# Patient Record
Sex: MALE | Race: WHITE | NOT HISPANIC OR LATINO | Employment: FULL TIME | URBAN - METROPOLITAN AREA
[De-identification: names, ages, dates, MRNs, and addresses within clinical notes are randomized per-mention and may not be internally consistent; named-entity substitution may affect disease eponyms.]

---

## 2023-08-16 LAB
CHOLESTEROL (MG/DL) IN SER/PLAS: 165 MG/DL (ref 0–199)
CHOLESTEROL IN HDL (MG/DL) IN SER/PLAS: 51 MG/DL
CHOLESTEROL/HDL RATIO: 3.2
ESTIMATED AVERAGE GLUCOSE FOR HBA1C: 85 MG/DL
HEMOGLOBIN A1C/HEMOGLOBIN TOTAL IN BLOOD: 4.6 %
LDL: 96 MG/DL (ref 0–99)
TRIGLYCERIDE (MG/DL) IN SER/PLAS: 92 MG/DL (ref 0–149)
VLDL: 18 MG/DL (ref 0–40)

## 2023-10-04 PROBLEM — F90.0 ADHD (ATTENTION DEFICIT HYPERACTIVITY DISORDER), INATTENTIVE TYPE: Status: ACTIVE | Noted: 2023-10-04

## 2023-10-04 PROBLEM — F32.A DEPRESSION: Status: ACTIVE | Noted: 2023-10-04

## 2023-10-04 PROBLEM — F98.8 ADD (ATTENTION DEFICIT DISORDER): Status: ACTIVE | Noted: 2021-12-20

## 2023-10-04 PROBLEM — L72.3 SEBACEOUS CYST: Status: ACTIVE | Noted: 2023-10-04

## 2023-10-04 PROBLEM — G43.909 MIGRAINE: Status: ACTIVE | Noted: 2021-12-20

## 2023-10-05 ENCOUNTER — TELEMEDICINE (OUTPATIENT)
Dept: BEHAVIORAL HEALTH | Facility: CLINIC | Age: 43
End: 2023-10-05
Payer: COMMERCIAL

## 2023-10-05 DIAGNOSIS — F32.89 OTHER DEPRESSION: ICD-10-CM

## 2023-10-05 PROCEDURE — 90834 PSYTX W PT 45 MINUTES: CPT | Performed by: PSYCHOLOGIST

## 2023-10-05 NOTE — PROGRESS NOTES
3 PM [id number] Indiv Psych for Depression, hx of ADHD (45 min)  Sixth session. Virtual. Doing OK, energy better. Able to get work done on manuscripts, one is accepted for publication. Trying to balance this with study time for boards. Has taken longer to get through material than he thought but making progress. Hopes to be done with material and on to practice tests at end of week. Moved back testing date to Oct 30. Plans on spending two weeks just taking tests. Confident in abilities. Not been exercising even though he knows he'd feel better. Discussed some concerns with his MPH program over taking leave and finances. Will address after taking exam. Still talking to Vanesa routinely but not socializing outside that. Feels a lot of pressure to prepare for exam and to work on manuscripts. Next: 1 month.

## 2023-11-11 RX ORDER — TADALAFIL 20 MG/1
TABLET ORAL
COMMUNITY
Start: 2023-06-28

## 2023-11-11 RX ORDER — BUPROPION HYDROCHLORIDE 150 MG/1
150 TABLET ORAL DAILY
COMMUNITY
Start: 2023-09-29 | End: 2023-11-22 | Stop reason: SDUPTHER

## 2023-11-16 ENCOUNTER — APPOINTMENT (OUTPATIENT)
Dept: BEHAVIORAL HEALTH | Facility: CLINIC | Age: 43
End: 2023-11-16
Payer: COMMERCIAL

## 2023-11-22 ENCOUNTER — TELEPHONE (OUTPATIENT)
Dept: BEHAVIORAL HEALTH | Facility: CLINIC | Age: 43
End: 2023-11-22
Payer: COMMERCIAL

## 2023-11-22 DIAGNOSIS — F32.4 MAJOR DEPRESSIVE DISORDER WITH SINGLE EPISODE, IN PARTIAL REMISSION (CMS-HCC): ICD-10-CM

## 2023-11-22 RX ORDER — BUPROPION HYDROCHLORIDE 150 MG/1
150 TABLET ORAL DAILY
Qty: 30 TABLET | Refills: 2 | Status: SHIPPED | OUTPATIENT
Start: 2023-11-22 | End: 2023-11-22 | Stop reason: SDUPTHER

## 2023-11-22 RX ORDER — BUPROPION HYDROCHLORIDE 150 MG/1
150 TABLET ORAL DAILY
Qty: 30 TABLET | Refills: 2 | Status: SHIPPED | OUTPATIENT
Start: 2023-11-22 | End: 2024-02-06 | Stop reason: SDUPTHER

## 2023-11-22 NOTE — TELEPHONE ENCOUNTER
"----- Message from Chandni Faustin RN sent at 11/22/2023  2:02 PM EST -----  Regarding: FW: Appointment/Refill  Contact: [phone]    ----- Message -----  From: Josafat Munoz \"Marty\"  Sent: 11/22/2023  11:13 AM EST  To: [id number] Clinical Support Staff  Subject: Appointment/Refill                               Dear Dr. Díaz    I received your voice text a few days ago. I will schedule with you after my exam which is in 7 days. I wanted to ask for a refill of Bupropion  mg before then because I only have 7 pills left. There have been no changes or side effects. Hope it’s possible before my test. Same pharmacy at [facility].    Happy thanksgiving     "

## 2024-01-18 ENCOUNTER — TELEMEDICINE (OUTPATIENT)
Dept: BEHAVIORAL HEALTH | Facility: CLINIC | Age: 44
End: 2024-01-18
Payer: COMMERCIAL

## 2024-01-18 DIAGNOSIS — F32.89 OTHER DEPRESSION: ICD-10-CM

## 2024-01-18 PROCEDURE — 90834 PSYTX W PT 45 MINUTES: CPT | Performed by: PSYCHOLOGIST

## 2024-01-18 NOTE — PROGRESS NOTES
3 PM 59588 Indiv Psych for Depression, hx of ADHD (45 min, [id number])  Seventh session, virtual. Supportive Therapy. Last seen in October. Passed boards at end of Oct, some relief. Ordered another study package. Finished priority manuscripts. 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 Waiting for license so can [facility] to [facility] for new visa. Is hoping it will come this week to allow them enough time to process. If approved could remain in country. If delayed, will have to go back to home country. Some anxiety regarding this. Has offer for full-time [employer] job but will have to be onboarded again. Still talking to Vanesa, hoping to have some serious talks about their potential future together. Believes they need to improve their communication skills as a couple. Not exercising but hopes to get back to. Discontinued antidepressant a month ago. Not taking stimulant for ADHD. Next: to call.

## 2024-02-06 ENCOUNTER — TELEPHONE (OUTPATIENT)
Dept: BEHAVIORAL HEALTH | Facility: CLINIC | Age: 44
End: 2024-02-06
Payer: COMMERCIAL

## 2024-02-06 DIAGNOSIS — F32.4 MAJOR DEPRESSIVE DISORDER WITH SINGLE EPISODE, IN PARTIAL REMISSION (CMS-HCC): ICD-10-CM

## 2024-02-06 DIAGNOSIS — F90.0 ADHD (ATTENTION DEFICIT HYPERACTIVITY DISORDER), INATTENTIVE TYPE: ICD-10-CM

## 2024-02-06 RX ORDER — DEXTROAMPHETAMINE SACCHARATE, AMPHETAMINE ASPARTATE MONOHYDRATE, DEXTROAMPHETAMINE SULFATE AND AMPHETAMINE SULFATE 5; 5; 5; 5 MG/1; MG/1; MG/1; MG/1
20 CAPSULE, EXTENDED RELEASE ORAL EVERY MORNING
Qty: 30 CAPSULE | Refills: 0 | Status: SHIPPED | OUTPATIENT
Start: 2024-02-06

## 2024-02-06 RX ORDER — BUPROPION HYDROCHLORIDE 150 MG/1
150 TABLET ORAL DAILY
Qty: 30 TABLET | Refills: 2 | Status: SHIPPED | OUTPATIENT
Start: 2024-02-06

## 2024-08-13 ENCOUNTER — PATIENT MESSAGE (OUTPATIENT)
Dept: BEHAVIORAL HEALTH | Facility: CLINIC | Age: 44
End: 2024-08-13
Payer: COMMERCIAL

## 2024-08-13 DIAGNOSIS — F32.4 MAJOR DEPRESSIVE DISORDER WITH SINGLE EPISODE, IN PARTIAL REMISSION (CMS-HCC): ICD-10-CM

## 2024-08-13 DIAGNOSIS — F90.0 ADHD (ATTENTION DEFICIT HYPERACTIVITY DISORDER), INATTENTIVE TYPE: ICD-10-CM

## 2024-08-15 ENCOUNTER — TELEPHONE (OUTPATIENT)
Dept: BEHAVIORAL HEALTH | Facility: CLINIC | Age: 44
End: 2024-08-15
Payer: COMMERCIAL

## 2024-08-15 RX ORDER — DEXTROAMPHETAMINE SACCHARATE, AMPHETAMINE ASPARTATE MONOHYDRATE, DEXTROAMPHETAMINE SULFATE AND AMPHETAMINE SULFATE 5; 5; 5; 5 MG/1; MG/1; MG/1; MG/1
20 CAPSULE, EXTENDED RELEASE ORAL DAILY
Qty: 30 CAPSULE | Refills: 0 | Status: SHIPPED | OUTPATIENT
Start: 2024-08-15

## 2024-08-15 RX ORDER — BUPROPION HYDROCHLORIDE 150 MG/1
150 TABLET ORAL DAILY
Qty: 30 TABLET | Refills: 2 | Status: SHIPPED | OUTPATIENT
Start: 2024-08-15

## 2024-09-11 ENCOUNTER — APPOINTMENT (OUTPATIENT)
Dept: BEHAVIORAL HEALTH | Facility: CLINIC | Age: 44
End: 2024-09-11
Payer: COMMERCIAL

## 2024-09-25 ENCOUNTER — APPOINTMENT (OUTPATIENT)
Dept: BEHAVIORAL HEALTH | Facility: CLINIC | Age: 44
End: 2024-09-25
Payer: COMMERCIAL

## 2024-09-25 DIAGNOSIS — F32.4 MAJOR DEPRESSIVE DISORDER WITH SINGLE EPISODE, IN PARTIAL REMISSION (CMS-HCC): ICD-10-CM

## 2024-09-25 DIAGNOSIS — F90.0 ADHD (ATTENTION DEFICIT HYPERACTIVITY DISORDER), INATTENTIVE TYPE: ICD-10-CM

## 2024-09-25 PROCEDURE — 99214 OFFICE O/P EST MOD 30 MIN: CPT | Performed by: PSYCHIATRY & NEUROLOGY

## 2024-09-25 RX ORDER — BUPROPION HYDROCHLORIDE 150 MG/1
150 TABLET ORAL DAILY
Qty: 30 TABLET | Refills: 2 | Status: SHIPPED | OUTPATIENT
Start: 2024-09-25

## 2024-09-25 RX ORDER — DEXTROAMPHETAMINE SACCHARATE, AMPHETAMINE ASPARTATE MONOHYDRATE, DEXTROAMPHETAMINE SULFATE AND AMPHETAMINE SULFATE 5; 5; 5; 5 MG/1; MG/1; MG/1; MG/1
20 CAPSULE, EXTENDED RELEASE ORAL EVERY MORNING
Qty: 30 CAPSULE | Refills: 0 | Status: SHIPPED | OUTPATIENT
Start: 2024-09-25

## 2024-09-25 RX ORDER — DEXTROAMPHETAMINE SACCHARATE, AMPHETAMINE ASPARTATE MONOHYDRATE, DEXTROAMPHETAMINE SULFATE AND AMPHETAMINE SULFATE 5; 5; 5; 5 MG/1; MG/1; MG/1; MG/1
20 CAPSULE, EXTENDED RELEASE ORAL EVERY MORNING
Qty: 30 CAPSULE | Refills: 0 | Status: SHIPPED | OUTPATIENT
Start: 2024-11-21

## 2024-09-25 RX ORDER — DEXTROAMPHETAMINE SACCHARATE, AMPHETAMINE ASPARTATE MONOHYDRATE, DEXTROAMPHETAMINE SULFATE AND AMPHETAMINE SULFATE 5; 5; 5; 5 MG/1; MG/1; MG/1; MG/1
20 CAPSULE, EXTENDED RELEASE ORAL DAILY
Qty: 30 CAPSULE | Refills: 0 | Status: SHIPPED | OUTPATIENT
Start: 2024-10-24

## 2024-09-25 NOTE — PROGRESS NOTES
"Outpatient Psychiatry - Follow-Up Visit with Established Patient       Subjective   Josafat Munoz \"Marty\", a 44 y.o. male, for follow up.  Patient was referred by No primary care provider on file..      Reason for Visit:  He has been experiencing ADHD, Depression.     HPI:   Pt reports he has been through a lot, had to return to [place], then had to get a new license.  He discusses having to leave the country for 3 months.  He felt the return actually helped a bit with his mood, his sense of connection to his family.  He would hve had to live [place] the states for 2-3 months without working.  He feels meds have been tolerable, and his mood has been better.  We discuss whether to go back on wellbutrin after a period of feeling good.  He has been using adderall for work days, feels that is very helpful in getting work done.  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  Procrastination has been less.  He has been working with Dr. Mcneil still, and feels it is going well.  They have moved to q2-3 weeks, because things have improved. He got an H1 visa. He has not had any crying spells. Sleep has been okay, staying up too late sometimes.. Appetite has been a little low, but no problems. He is having some enjoyment of things, soccer, exercising, watching soccer. He denies SI, no wishes to die, feels he would never do this.  He has met with his ex, and they have gone out a couple times. They have been talking daily. His sister and brother both had children this year, planning a visit in the next year. 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 His family is not aware of his depression treatment. He has wanted to get things in order first.  He will be getting a green card when possible.      Current Medications:    Current Outpatient Medications:     amphetamine-dextroamphetamine XR (Adderall XR) 20 mg 24 hr capsule, TAKE 1 CAPSULE BY MOUTH ONCE DAILY (DNF UNTIL 09/26/23), Disp: 30 capsule, " Rfl: 0    amphetamine-dextroamphetamine XR (Adderall XR) 20 mg 24 hr capsule, Take 1 capsule (20 mg) by mouth once daily in the morning., Disp: 30 capsule, Rfl: 0    amphetamine-dextroamphetamine XR (Adderall XR) 20 mg 24 hr capsule, TAKE 1 CAPSULE BY MOUTH ONCE DAILY, Disp: 30 capsule, Rfl: 0    buPROPion XL (Wellbutrin XL) 150 mg 24 hr tablet, Take 1 tablet (150 mg) by mouth once daily., Disp: 30 tablet, Rfl: 2    tadalafil 20 mg tablet, AS DIRECTED, Disp: , Rfl:   Medical History and Updates:  Past Medical History:   Diagnosis Date    Allergy status to unspecified drugs, medicaments and biological substances 06/20/2017    History of seasonal allergies    Vitamin D deficiency, unspecified     Vitamin D deficiency     Surgical History and Updates:  No past surgical history on file.  Family History and Updates:  Family History   Problem Relation Name Age of Onset    No Known Problems Mother      No Known Problems Father       Social History and Updates:  Social History     Socioeconomic History    Marital status: Single     Spouse name: Not on file    Number of children: Not on file    Years of education: Not on file    Highest education level: Not on file   Occupational History    Not on file   Tobacco Use    Smoking status: Not on file    Smokeless tobacco: Not on file   Substance and Sexual Activity    Alcohol use: Not on file    Drug use: Not on file    Sexual activity: Not on file   Other Topics Concern    Not on file   Social History Narrative    Not on file     Social Determinants of Health     Financial Resource Strain: Not on file   Food Insecurity: Not on file   Transportation Needs: Not on file   Physical Activity: Not on file   Stress: Not on file   Social Connections: Not on file   Intimate Partner Violence: Not on file   Housing Stability: Not on file       Additional historical information includes:   The patient's household members: Living alone, in [place] x 1 year, prior to that in [place].  "  Relationships: No longer seeing anyone.  Sister lives in [place], other living in [place] - researching sexual health and women's studies.   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 May find work here, must be underserved.   Legal history: none.    History: none.   Upbringing: Grew up in [place], attending [employer]. Happy childhood, \"cherished.\" Has family relationships of high quality.   Abuse/neglect history:   not physically abused   not sexually abused   no neglect/ abandonment   no domestic violence   not a witness to violence   not verbally or emotionally abused   Record Review: brief     Medical Review Of Systems:  Pertinent items are noted in HPI.    Psychiatric Review Of Systems:  Depressive Symptoms: Depressed/Irritable mood, Diminished interest, Poor concentration, Fatigue, and N/A  Manic Symptoms: negative  Anxiety Symptoms: Excessive worry, Difficulty controlling worry, and Difficulty concentration due to worry  Inattentive Symptoms: Often makes careless mistakes, Often has difficulty paying attention, Is often disorganized, Often losses things, Is often easily distracted, Is often forgetful, Often avoids/dislikes tasks with sustained mental effort, Often seems not to listen when spoken to directly, and Often fails to follow instructions or to finish schoolwork   Trauma Symptoms: None  Psychotic Symptoms: None     Objective   Mental Status Exam:  General Appearance: Well groomed, appropriate eye contact  Attitude/Behavior: Cooperative  Motor: No psychomotor agitation or retardation, no tremor or other abnormal movements  Speech: Normal rate, volume, prosody  Gait/Station: WFL - Within functional limits  Mood: Good  Affect: Euthymic, full-range  Thought Process: Linear, goal directed  Thought Associations: No loosening of associations  Thought Content: Normal  Perception: No perceptual abnormalities noted  Sensorium: Alert and oriented to person, place, time " and situation  Insight: Intact  Judgement: Intact  Cognition: Cognitively intact to conversational testing with respect to attention, orientation, fund of knowledge, recent and remote memory, and language    Other Objective Information including Laboratory and Imaging Results:  No visits with results within 30 Day(s) from this visit.   Latest known visit with results is:   Orders Only on 08/16/2023   Component Date Value Ref Range Status    Hemoglobin A1C 08/16/2023 4.6  % Final    Comment:      Diagnosis of Diabetes-Adults   Non-Diabetic: < or = 5.6%   Increased risk for developing diabetes: 5.7-6.4%   Diagnostic of diabetes: > or = 6.5%  .       Monitoring of Diabetes                Age (y)     Therapeutic Goal (%)   Adults:          >18           <7.0   Pediatrics:    13-18           <7.5                   7-12           <8.0                   0- 6            7.5-8.5   American Diabetes Association. Diabetes Care 33(S1), Jan 2010.      Estimated Average Glucose 08/16/2023 85  MG/DL Final    Cholesterol 08/16/2023 165  0 - 199 mg/dL Final    Comment: .      AGE      DESIRABLE   BORDERLINE HIGH   HIGH     0-19 Y     0 - 169       170 - 199     >/= 200    20-24 Y     0 - 189       190 - 224     >/= 225         >24 Y     0 - 199       200 - 239     >/= 240   **All ranges are based on fasting samples. Specific   therapeutic targets will vary based on patient-specific   cardiac risk.  .   Pediatric guidelines reference:Pediatrics 2011, 128(S5).   Adult guidelines reference: NCEP ATPIII Guidelines,     KIMBERLY 2001, 258:2486-97  .   Venipuncture immediately after or during the    administration of Metamizole may lead to falsely   low results. Testing should be performed immediately   prior to Metamizole dosing.      HDL 08/16/2023 51.0  mg/dL Final    Comment: .      AGE      VERY LOW   LOW     NORMAL    HIGH       0-19 Y       < 35   < 40     40-45     ----    20-24 Y       ----   < 40       >45     ----      >24 Y        ----   < 40     40-60      >60  .      Cholesterol/HDL Ratio 08/16/2023 3.2   Final    Comment: REF VALUES  DESIRABLE  < 3.4  HIGH RISK  > 5.0      LDL 08/16/2023 96  0 - 99 mg/dL Final    Comment: .                           NEAR      BORD      AGE      DESIRABLE  OPTIMAL    HIGH     HIGH     VERY HIGH     0-19 Y     0 - 109     ---    110-129   >/= 130     ----    20-24 Y     0 - 119     ---    120-159   >/= 160     ----      >24 Y     0 -  99   100-129  130-159   160-189     >/=190  .      VLDL 08/16/2023 18  0 - 40 mg/dL Final    Triglycerides 08/16/2023 92  0 - 149 mg/dL Final    Comment: .      AGE      DESIRABLE   BORDERLINE HIGH   HIGH     VERY HIGH   0 D-90 D    19 - 174         ----         ----        ----  91 D- 9 Y     0 -  74        75 -  99     >/= 100      ----    10-19 Y     0 -  89        90 - 129     >/= 130      ----    20-24 Y     0 - 114       115 - 149     >/= 150      ----         >24 Y     0 - 149       150 - 199    200- 499    >/= 500  .   Venipuncture immediately after or during the    administration of Metamizole may lead to falsely   low results. Testing should be performed immediately   prior to Metamizole dosing.         OARRS Reviewed: no concerns  08/15/2024 08/15/2024 1 Dextroamp-Amphet Er 20 Mg Cap 30.00 30 An [facility] [id number] [place] ([id number]) 0  [place]   02/07/2024 02/06/2024 2 Dextroamp-Amphet Er 20 Mg Cap 30.00 30 An [place] [id number] [place] ([id number]) 0  [place]   09/29/2023 08/29/2023 2 Dextroamp-Amphet Er 20 Mg Cap 30.00 30 An [place] [id number] [place] ([id number]) 0  [place]   08/15/2023 08/15/2023 2 Dextroamp-Amphet Er 20 Mg Cap 30.00 30 An [place] [id number] [place] ([id number]) 0  [place]   02/17/2023 01/18/2023 1 Dextroamp-Amphet Er 20 Mg Cap 30.00 30 An [place] [id number] [place] ([id number]) 0  [place]       Vitals:  There were no vitals filed for this visit.    Assessment/Plan   Diagnosis:   Patient Active Problem List   Diagnosis    ADD (attention deficit disorder)    ADHD (attention deficit hyperactivity disorder),  inattentive type    Migraine    Sebaceous cyst    Depression       Assessment:  35yo [place] m, hx of ADHD, Depression, presents for evaluation and treatment. He has had increasing feeling of falling behind, losing things, forgetting things, losing information, and being less organized. He is at acute and chronic low risk of suicide and homicide. He is likely to do well with previous regimen. Pt reporting worse mood, and open to trial of psychotherapy and medication.     Continue Adderall XR 20mg daily.  Continue Wellbutrin 150mg XL Daily.  I reviewed with the patient the possible side effects of medication, including all black box warnings.  Discussed options including SSRI  OARRS report reviewed no issues.    Treatment Goals:  Specify outcomes written in observable, behavioral terms:   Anxiety: reducing negative automatic thoughts and reducing physical symptoms of anxiety  Depression: increasing energy, increasing interest in usual activities, increasing motivation, reducing excessive guilt, reducing fatigue, and reducing negative automatic thoughts    Suicide Risk Assessment:  Low Risk -- Risk factors include: Depression Protective factors include:Denies current suicidal ideation, Denies history of suicide attempts , Future-oriented talk , and Willingness to seek help and support     Treatment Plan/Recommendations:   Follow-up plan was discussed with patient.      Referral to:  Outpatient individual therapy.    Review with patient: Treatment plan reviewed with the patient.  Medication risks/benefit reviewed with the patient    Time spent in therapy 20 min  Summary of Psychotherapy component:   Supportive therapy for depression sx  Total time spent 30 min    Mando Díaz MD

## 2024-12-18 ENCOUNTER — APPOINTMENT (OUTPATIENT)
Dept: BEHAVIORAL HEALTH | Facility: CLINIC | Age: 44
End: 2024-12-18
Payer: COMMERCIAL

## 2025-01-10 ENCOUNTER — APPOINTMENT (OUTPATIENT)
Dept: BEHAVIORAL HEALTH | Facility: CLINIC | Age: 45
End: 2025-01-10
Payer: COMMERCIAL

## 2025-01-10 DIAGNOSIS — F32.4 MAJOR DEPRESSIVE DISORDER WITH SINGLE EPISODE, IN PARTIAL REMISSION (CMS-HCC): ICD-10-CM

## 2025-01-10 DIAGNOSIS — F90.0 ADHD (ATTENTION DEFICIT HYPERACTIVITY DISORDER), INATTENTIVE TYPE: ICD-10-CM

## 2025-01-10 PROCEDURE — 99214 OFFICE O/P EST MOD 30 MIN: CPT | Performed by: PSYCHIATRY & NEUROLOGY

## 2025-01-10 RX ORDER — DEXTROAMPHETAMINE SACCHARATE, AMPHETAMINE ASPARTATE MONOHYDRATE, DEXTROAMPHETAMINE SULFATE AND AMPHETAMINE SULFATE 5; 5; 5; 5 MG/1; MG/1; MG/1; MG/1
20 CAPSULE, EXTENDED RELEASE ORAL EVERY MORNING
Qty: 30 CAPSULE | Refills: 0 | Status: SHIPPED | OUTPATIENT
Start: 2025-01-10

## 2025-01-10 RX ORDER — DEXTROAMPHETAMINE SACCHARATE, AMPHETAMINE ASPARTATE MONOHYDRATE, DEXTROAMPHETAMINE SULFATE AND AMPHETAMINE SULFATE 5; 5; 5; 5 MG/1; MG/1; MG/1; MG/1
20 CAPSULE, EXTENDED RELEASE ORAL EVERY MORNING
Qty: 30 CAPSULE | Refills: 0 | Status: SHIPPED | OUTPATIENT
Start: 2025-03-07

## 2025-01-10 RX ORDER — BUPROPION HYDROCHLORIDE 150 MG/1
150 TABLET ORAL DAILY
Qty: 30 TABLET | Refills: 2 | Status: SHIPPED | OUTPATIENT
Start: 2025-01-10

## 2025-01-10 RX ORDER — DEXTROAMPHETAMINE SACCHARATE, AMPHETAMINE ASPARTATE MONOHYDRATE, DEXTROAMPHETAMINE SULFATE AND AMPHETAMINE SULFATE 5; 5; 5; 5 MG/1; MG/1; MG/1; MG/1
20 CAPSULE, EXTENDED RELEASE ORAL DAILY
Qty: 30 CAPSULE | Refills: 0 | Status: SHIPPED | OUTPATIENT
Start: 2025-02-07

## 2025-01-10 NOTE — PROGRESS NOTES
"Outpatient Psychiatry - Follow-Up Visit with Established Patient       Baljit Munoz \"Marty\", a 44 y.o. male, for follow up.  Patient was referred by No primary care provider on file..      Reason for Visit:  He has been experiencing ADHD, Depression.     HPI:   Pt reports he is doing well.  He moved to a bigger apartment.  He has been working one week on one week off, and he feels the work is tolerable.  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  [facility] feels [facility].  He discusses feeling respected.  The hours are long, and taking a lot of responsibility for the full spectrum of the case is difficult.  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  He is staying active in research.  He discusses process of finding his next role.  He feels meds have been tolerable, and his mood has been better.  He has had a reasonable mood, and concentration has been good.  He has been using adderall for work days, feels that is very helpful in getting work done.  Procrastination has been less.  He has been working with Dr. Mcneil still, and feels it is going well.  They have moved to q2-3 weeks, because things have improved.  He got an H1 visa. He has not had any crying spells.  Sleep has been okay, staying up too late sometimes.  Appetite has been a little low, but no problems. He is having some enjoyment of things, soccer, exercising, watching soccer. He denies SI, no wishes to die, feels he would never do this.  He has met with his ex, and they have gone out a couple times. They have been talking daily. His sister and brother both had children this year, planning a visit in the next year. 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 He is spending more time with Vanesa, and thinking there may be a chance to get back together.  He has wanted to get things in order first.  He will be getting a green card when possible.    Current Medications:    Current " Outpatient Medications:     amphetamine-dextroamphetamine XR (Adderall XR) 20 mg 24 hr capsule, Take 1 capsule (20 mg) by mouth once daily in the morning., Disp: 30 capsule, Rfl: 0    amphetamine-dextroamphetamine XR (Adderall XR) 20 mg 24 hr capsule, Take 1 capsule (20 mg) by mouth once daily. Do not fill before October 24, 2024., Disp: 30 capsule, Rfl: 0    amphetamine-dextroamphetamine XR (Adderall XR) 20 mg 24 hr capsule, Take 1 capsule (20 mg) by mouth once daily in the morning. Do not fill before November 21, 2024., Disp: 30 capsule, Rfl: 0    buPROPion XL (Wellbutrin XL) 150 mg 24 hr tablet, Take 1 tablet (150 mg) by mouth once daily., Disp: 30 tablet, Rfl: 2    tadalafil 20 mg tablet, AS DIRECTED, Disp: , Rfl:   Medical History and Updates:  Past Medical History:   Diagnosis Date    Allergy status to unspecified drugs, medicaments and biological substances 06/20/2017    History of seasonal allergies    Vitamin D deficiency, unspecified     Vitamin D deficiency     Surgical History and Updates:  No past surgical history on file.  Family History and Updates:  Family History   Problem Relation Name Age of Onset    No Known Problems Mother      No Known Problems Father       Social History and Updates:  Social History     Socioeconomic History    Marital status: Single     Spouse name: Not on file    Number of children: Not on file    Years of education: Not on file    Highest education level: Not on file   Occupational History    Not on file   Tobacco Use    Smoking status: Not on file    Smokeless tobacco: Not on file   Substance and Sexual Activity    Alcohol use: Not on file    Drug use: Not on file    Sexual activity: Not on file   Other Topics Concern    Not on file   Social History Narrative    Not on file     Social Drivers of Health     Financial Resource Strain: Not on file   Food Insecurity: Not on file   Transportation Needs: Not on file   Physical Activity: Not on file   Stress: Not on file   Social  "Connections: Not on file   Intimate Partner Violence: Not on file   Housing Stability: Not on file       Additional historical information includes:   The patient's household members: Living alone, in [place] x 1 year, prior to that in [place].   Relationships: No longer seeing anyone.  Sister lives in [place], other living in [place] - researching sexual health and women's studies.   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 May find work here, must be underserved.   Legal history: none.    History: none.   Upbringing: Grew up in [place], attending [employer]. Happy childhood, \"cherished.\" Has family relationships of high quality.   Abuse/neglect history:   not physically abused   not sexually abused   no neglect/ abandonment   no domestic violence   not a witness to violence   not verbally or emotionally abused   Record Review: brief     Medical Review Of Systems:  Pertinent items are noted in HPI.    Psychiatric Review Of Systems:  Depressive Symptoms: Depressed/Irritable mood, Diminished interest, Poor concentration, Fatigue, and N/A  Manic Symptoms: negative  Anxiety Symptoms: Excessive worry, Difficulty controlling worry, and Difficulty concentration due to worry  Inattentive Symptoms: Often makes careless mistakes, Often has difficulty paying attention, Is often disorganized, Often losses things, Is often easily distracted, Is often forgetful, Often avoids/dislikes tasks with sustained mental effort, Often seems not to listen when spoken to directly, and Often fails to follow instructions or to finish schoolwork   Trauma Symptoms: None  Psychotic Symptoms: None     Objective   Mental Status Exam:  General Appearance: Well groomed, appropriate eye contact  Attitude/Behavior: Cooperative  Motor: No psychomotor agitation or retardation, no tremor or other abnormal movements  Speech: Normal rate, volume, prosody  Gait/Station: WFL - Within functional limits  Mood: " Good  Affect: Euthymic, full-range  Thought Process: Linear, goal directed  Thought Associations: No loosening of associations  Thought Content: Normal  Perception: No perceptual abnormalities noted  Sensorium: Alert and oriented to person, place, time and situation  Insight: Intact  Judgement: Intact  Cognition: Cognitively intact to conversational testing with respect to attention, orientation, fund of knowledge, recent and remote memory, and language    Other Objective Information including Laboratory and Imaging Results:  No visits with results within 30 Day(s) from this visit.   Latest known visit with results is:   Orders Only on 08/16/2023   Component Date Value Ref Range Status    Hemoglobin A1C 08/16/2023 4.6  % Final    Comment:      Diagnosis of Diabetes-Adults   Non-Diabetic: < or = 5.6%   Increased risk for developing diabetes: 5.7-6.4%   Diagnostic of diabetes: > or = 6.5%  .       Monitoring of Diabetes                Age (y)     Therapeutic Goal (%)   Adults:          >18           <7.0   Pediatrics:    13-18           <7.5                   7-12           <8.0                   0- 6            7.5-8.5   American Diabetes Association. Diabetes Care 33(S1), Jan 2010.      Estimated Average Glucose 08/16/2023 85  MG/DL Final    Cholesterol 08/16/2023 165  0 - 199 mg/dL Final    Comment: .      AGE      DESIRABLE   BORDERLINE HIGH   HIGH     0-19 Y     0 - 169       170 - 199     >/= 200    20-24 Y     0 - 189       190 - 224     >/= 225         >24 Y     0 - 199       200 - 239     >/= 240   **All ranges are based on fasting samples. Specific   therapeutic targets will vary based on patient-specific   cardiac risk.  .   Pediatric guidelines reference:Pediatrics 2011, 128(S5).   Adult guidelines reference: NCEP ATPIII Guidelines,     KIMBERLY 2001, 258:2486-97  .   Venipuncture immediately after or during the    administration of Metamizole may lead to falsely   low results. Testing should be performed  immediately   prior to Metamizole dosing.      HDL 08/16/2023 51.0  mg/dL Final    Comment: .      AGE      VERY LOW   LOW     NORMAL    HIGH       0-19 Y       < 35   < 40     40-45     ----    20-24 Y       ----   < 40       >45     ----      >24 Y       ----   < 40     40-60      >60  .      Cholesterol/HDL Ratio 08/16/2023 3.2   Final    Comment: REF VALUES  DESIRABLE  < 3.4  HIGH RISK  > 5.0      LDL 08/16/2023 96  0 - 99 mg/dL Final    Comment: .                           NEAR      BORD      AGE      DESIRABLE  OPTIMAL    HIGH     HIGH     VERY HIGH     0-19 Y     0 - 109     ---    110-129   >/= 130     ----    20-24 Y     0 - 119     ---    120-159   >/= 160     ----      >24 Y     0 -  99   100-129  130-159   160-189     >/=190  .      VLDL 08/16/2023 18  0 - 40 mg/dL Final    Triglycerides 08/16/2023 92  0 - 149 mg/dL Final    Comment: .      AGE      DESIRABLE   BORDERLINE HIGH   HIGH     VERY HIGH   0 D-90 D    19 - 174         ----         ----        ----  91 D- 9 Y     0 -  74        75 -  99     >/= 100      ----    10-19 Y     0 -  89        90 - 129     >/= 130      ----    20-24 Y     0 - 114       115 - 149     >/= 150      ----         >24 Y     0 - 149       150 - 199    200- 499    >/= 500  .   Venipuncture immediately after or during the    administration of Metamizole may lead to falsely   low results. Testing should be performed immediately   prior to Metamizole dosing.         OARRS Reviewed: no concerns  09/25/2024 09/25/2024 1 Dextroamp-Amphet Er 20 Mg Cap 30.00 30 An Hun [id number] [place] ([id number]) 0  [place]   08/15/2024 08/15/2024 1 Dextroamp-Amphet Er 20 Mg Cap 30.00 30 An Hun [id number] [place] ([id number]) 0  [place]   02/07/2024 02/06/2024 2 Dextroamp-Amphet Er 20 Mg Cap 30.00 30 An Hun [id number] [place] ([id number]) 0  [place]   09/29/2023 08/29/2023 2 Dextroamp-Amphet Er 20 Mg Cap 30.00 30 An Alex [id number] [place] ([id number]) 0  [place]   08/15/2023 08/15/2023 2 Dextroamp-Amphet Er 20 Mg Cap 30.00 30 Odalys Johnson  [id number] [place] ([id number]) 0  [place]   02/17/2023 01/18/2023 1 Dextroamp-Amphet Er 20 Mg Cap 30.00 30 An [facility] [phone] [facility] ([id number]) 0  [place]       Vitals:  There were no vitals filed for this visit.    Assessment/Plan   Diagnosis:   Patient Active Problem List   Diagnosis    ADD (attention deficit disorder)    ADHD (attention deficit hyperactivity disorder), inattentive type    Migraine    Sebaceous cyst    Depression       Assessment:  43yo [place] Syri[place] m, hx of ADHD, Depression, presents for evaluation and treatment. He has had increasing feeling of falling behind, losing things, forgetting things, losing information, and being less organized. He is at acute and chronic low risk of suicide and homicide. He is likely to do well with previous regimen. Pt reporting worse mood, and open to trial of psychotherapy and medication.     Continue Adderall XR 20mg daily.  Continue Wellbutrin 150mg XL Daily.  I reviewed with the patient the possible side effects of medication, including all black box warnings.  Discussed options including SSRI  OARRS report reviewed no issues.    Treatment Goals:  Specify outcomes written in observable, behavioral terms:   Anxiety: reducing negative automatic thoughts and reducing physical symptoms of anxiety  Depression: increasing energy, increasing interest in usual activities, increasing motivation, reducing excessive guilt, reducing fatigue, and reducing negative automatic thoughts    Suicide Risk Assessment:  Low Risk -- Risk factors include: Depression Protective factors include:Denies current suicidal ideation, Denies history of suicide attempts , Future-oriented talk , and Willingness to seek help and support     Treatment Plan/Recommendations:   Follow-up plan was discussed with patient.      Referral to:  Outpatient individual therapy.    Review with patient: Treatment plan reviewed with the patient.  Medication risks/benefit reviewed with the patient    Time spent in therapy 20  min  Summary of Psychotherapy component:   Supportive therapy for depression sx  Total time spent 30 min    Mando Díaz MD

## 2025-02-21 ENCOUNTER — APPOINTMENT (OUTPATIENT)
Dept: BEHAVIORAL HEALTH | Facility: CLINIC | Age: 45
End: 2025-02-21
Payer: COMMERCIAL

## 2025-02-21 DIAGNOSIS — F90.0 ADHD (ATTENTION DEFICIT HYPERACTIVITY DISORDER), INATTENTIVE TYPE: ICD-10-CM

## 2025-02-21 DIAGNOSIS — F32.4 MAJOR DEPRESSIVE DISORDER WITH SINGLE EPISODE, IN PARTIAL REMISSION (CMS-HCC): ICD-10-CM

## 2025-02-21 PROCEDURE — 99214 OFFICE O/P EST MOD 30 MIN: CPT | Performed by: PSYCHIATRY & NEUROLOGY

## 2025-02-21 RX ORDER — DEXTROAMPHETAMINE SACCHARATE, AMPHETAMINE ASPARTATE MONOHYDRATE, DEXTROAMPHETAMINE SULFATE AND AMPHETAMINE SULFATE 5; 5; 5; 5 MG/1; MG/1; MG/1; MG/1
20 CAPSULE, EXTENDED RELEASE ORAL DAILY
Qty: 30 CAPSULE | Refills: 0 | Status: SHIPPED | OUTPATIENT
Start: 2025-04-18

## 2025-02-21 RX ORDER — BUPROPION HYDROCHLORIDE 150 MG/1
150 TABLET ORAL DAILY
Qty: 30 TABLET | Refills: 2 | Status: SHIPPED | OUTPATIENT
Start: 2025-02-21

## 2025-02-21 RX ORDER — DEXTROAMPHETAMINE SACCHARATE, AMPHETAMINE ASPARTATE MONOHYDRATE, DEXTROAMPHETAMINE SULFATE AND AMPHETAMINE SULFATE 5; 5; 5; 5 MG/1; MG/1; MG/1; MG/1
20 CAPSULE, EXTENDED RELEASE ORAL EVERY MORNING
Qty: 30 CAPSULE | Refills: 0 | Status: SHIPPED | OUTPATIENT
Start: 2025-03-21

## 2025-02-21 RX ORDER — DEXTROAMPHETAMINE SACCHARATE, AMPHETAMINE ASPARTATE MONOHYDRATE, DEXTROAMPHETAMINE SULFATE AND AMPHETAMINE SULFATE 5; 5; 5; 5 MG/1; MG/1; MG/1; MG/1
20 CAPSULE, EXTENDED RELEASE ORAL EVERY MORNING
Qty: 30 CAPSULE | Refills: 0 | Status: SHIPPED | OUTPATIENT
Start: 2025-02-21

## 2025-02-21 NOTE — PROGRESS NOTES
"Outpatient Psychiatry - Follow-Up Visit with Established Patient       Subjective   Josafat Munoz \"Marty\", a 44 y.o. male, for follow up.  Patient was referred by No primary care provider on file..      Reason for Visit:  He has been experiencing ADHD, Depression.     HPI:   Pt reports he is doing well.  He feels he has increasing support.  He moved to a bigger apartment.  He has been working one week on one week off, and he feels the work is tolerable.  He has had 12 hour nights, and taking the adderall during his shifts.  He feels he has not made any errors and he has felt comfortable even with high intensity situations.  He has even enjoyed doing ED work.  He has felt on point with remembering where everyone is in.  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  He has attended better to sleep.  Showing up early for shifts.  [facility] feels [facility].  He discusses feeling respected.  The hours are long, and taking a lot of responsibility for the full spectrum of the case is difficult.  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  He is staying active in research.  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  His parents are coming to visit.  He discusses process of finding his next role.  He feels meds have been tolerable, and his mood has been better.  He has had a reasonable mood, and concentration has been good.  He has been using adderall for work days, feels that is very helpful in getting work done.  Procrastination has been less.  He has not seen Dr. Mcneil recently, but still wants to follow up.  He got an H1 visa. He has not had any crying spells.  Sleep has been okay, staying up too late sometimes.  Appetite has been a little low, but no problems. He is having some enjoyment of things, soccer, exercising, watching soccer. He denies SI, no wishes to die, feels he would never do this.  He has met with his ex, and they have gone out a " couple times. They have been talking daily. His sister and brother both had children this year, planning a visit in the next year. 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 He is spending more time with Vanesa, and thinking there may be a chance to get back together.  He has wanted to get things in order first.  He will be getting a green card when possible.    Current Medications:    Current Outpatient Medications:     amphetamine-dextroamphetamine XR (Adderall XR) 20 mg 24 hr capsule, Take 1 capsule (20 mg) by mouth once daily in the morning., Disp: 30 capsule, Rfl: 0    [START ON 3/7/2025] amphetamine-dextroamphetamine XR (Adderall XR) 20 mg 24 hr capsule, Take 1 capsule (20 mg) by mouth once daily in the morning. Do not fill before March 7, 2025., Disp: 30 capsule, Rfl: 0    amphetamine-dextroamphetamine XR (Adderall XR) 20 mg 24 hr capsule, Take 1 capsule (20 mg) by mouth once daily. Do not fill before February 7, 2025., Disp: 30 capsule, Rfl: 0    buPROPion XL (Wellbutrin XL) 150 mg 24 hr tablet, Take 1 tablet (150 mg) by mouth once daily., Disp: 30 tablet, Rfl: 2    tadalafil 20 mg tablet, AS DIRECTED, Disp: , Rfl:   Medical History and Updates:  Past Medical History:   Diagnosis Date    Allergy status to unspecified drugs, medicaments and biological substances 06/20/2017    History of seasonal allergies    Vitamin D deficiency, unspecified     Vitamin D deficiency     Surgical History and Updates:  No past surgical history on file.  Family History and Updates:  Family History   Problem Relation Name Age of Onset    No Known Problems Mother      No Known Problems Father       Social History and Updates:  Social History     Socioeconomic History    Marital status: Single     Spouse name: Not on file    Number of children: Not on file    Years of education: Not on file    Highest education level: Not on file   Occupational History    Not on file   Tobacco Use    Smoking status: Not on file     "Smokeless tobacco: Not on file   Substance and Sexual Activity    Alcohol use: Not on file    Drug use: Not on file    Sexual activity: Not on file   Other Topics Concern    Not on file   Social History Narrative    Not on file     Social Drivers of Health     Financial Resource Strain: Not on file   Food Insecurity: Not on file   Transportation Needs: Not on file   Physical Activity: Not on file   Stress: Not on file   Social Connections: Not on file   Intimate Partner Violence: Not on file   Housing Stability: Not on file       Additional historical information includes:   The patient's household members: Living alone, in [place] x 1 year, prior to that in [place].   Relationships: No longer seeing anyone.  Sister lives in [place], other living in [place] - researching sexual health and women's studies.   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 May find work here, must be underserved.   Legal history: none.    History: none.   Upbringing: Grew up in [place], attending [employer]. Happy childhood, \"cherished.\" Has family relationships of high quality.   Abuse/neglect history:   not physically abused   not sexually abused   no neglect/ abandonment   no domestic violence   not a witness to violence   not verbally or emotionally abused   Record Review: brief     Medical Review Of Systems:  Pertinent items are noted in HPI.    Psychiatric Review Of Systems:  Depressive Symptoms: Depressed/Irritable mood, Diminished interest, Poor concentration, Fatigue, and N/A  Manic Symptoms: negative  Anxiety Symptoms: Excessive worry, Difficulty controlling worry, and Difficulty concentration due to worry  Inattentive Symptoms: Often makes careless mistakes, Often has difficulty paying attention, Is often disorganized, Often losses things, Is often easily distracted, Is often forgetful, Often avoids/dislikes tasks with sustained mental effort, Often seems not to listen when spoken to " directly, and Often fails to follow instructions or to finish schoolwork   Trauma Symptoms: None  Psychotic Symptoms: None     Objective   Mental Status Exam:  General Appearance: Well groomed, appropriate eye contact  Attitude/Behavior: Cooperative  Motor: No psychomotor agitation or retardation, no tremor or other abnormal movements  Speech: Normal rate, volume, prosody  Gait/Station: WFL - Within functional limits  Mood: Good  Affect: Euthymic, full-range  Thought Process: Linear, goal directed  Thought Associations: No loosening of associations  Thought Content: Normal  Perception: No perceptual abnormalities noted  Sensorium: Alert and oriented to person, place, time and situation  Insight: Intact  Judgement: Intact  Cognition: Cognitively intact to conversational testing with respect to attention, orientation, fund of knowledge, recent and remote memory, and language    Other Objective Information including Laboratory and Imaging Results:  No visits with results within 30 Day(s) from this visit.   Latest known visit with results is:   Orders Only on 08/16/2023   Component Date Value Ref Range Status    Hemoglobin A1C 08/16/2023 4.6  % Final    Comment:      Diagnosis of Diabetes-Adults   Non-Diabetic: < or = 5.6%   Increased risk for developing diabetes: 5.7-6.4%   Diagnostic of diabetes: > or = 6.5%  .       Monitoring of Diabetes                Age (y)     Therapeutic Goal (%)   Adults:          >18           <7.0   Pediatrics:    13-18           <7.5                   7-12           <8.0                   0- 6            7.5-8.5   American Diabetes Association. Diabetes Care 33(S1), Jan 2010.      Estimated Average Glucose 08/16/2023 85  MG/DL Final    Cholesterol 08/16/2023 165  0 - 199 mg/dL Final    Comment: .      AGE      DESIRABLE   BORDERLINE HIGH   HIGH     0-19 Y     0 - 169       170 - 199     >/= 200    20-24 Y     0 - 189       190 - 224     >/= 225         >24 Y     0 - 199       200 - 239      >/= 240   **All ranges are based on fasting samples. Specific   therapeutic targets will vary based on patient-specific   cardiac risk.  .   Pediatric guidelines reference:Pediatrics 2011, 128(S5).   Adult guidelines reference: NCEP ATPIII Guidelines,     KIMBERLY 2001, 258:2486-97  .   Venipuncture immediately after or during the    administration of Metamizole may lead to falsely   low results. Testing should be performed immediately   prior to Metamizole dosing.      HDL 08/16/2023 51.0  mg/dL Final    Comment: .      AGE      VERY LOW   LOW     NORMAL    HIGH       0-19 Y       < 35   < 40     40-45     ----    20-24 Y       ----   < 40       >45     ----      >24 Y       ----   < 40     40-60      >60  .      Cholesterol/HDL Ratio 08/16/2023 3.2   Final    Comment: REF VALUES  DESIRABLE  < 3.4  HIGH RISK  > 5.0      LDL 08/16/2023 96  0 - 99 mg/dL Final    Comment: .                           NEAR      BORD      AGE      DESIRABLE  OPTIMAL    HIGH     HIGH     VERY HIGH     0-19 Y     0 - 109     ---    110-129   >/= 130     ----    20-24 Y     0 - 119     ---    120-159   >/= 160     ----      >24 Y     0 -  99   100-129  130-159   160-189     >/=190  .      VLDL 08/16/2023 18  0 - 40 mg/dL Final    Triglycerides 08/16/2023 92  0 - 149 mg/dL Final    Comment: .      AGE      DESIRABLE   BORDERLINE HIGH   HIGH     VERY HIGH   0 D-90 D    19 - 174         ----         ----        ----  91 D- 9 Y     0 -  74        75 -  99     >/= 100      ----    10-19 Y     0 -  89        90 - 129     >/= 130      ----    20-24 Y     0 - 114       115 - 149     >/= 150      ----         >24 Y     0 - 149       150 - 199    200- 499    >/= 500  .   Venipuncture immediately after or during the    administration of Metamizole may lead to falsely   low results. Testing should be performed immediately   prior to Metamizole dosing.         OARRS Reviewed: no concerns  01/10/2025 01/10/2025 2 Dextroamp-Amphet Er 20 Mg Cap 30.00 30 An Hun  [id number] [place] ([id number]) 0  [place]   09/25/2024 09/25/2024 2 Dextroamp-Amphet Er 20 Mg Cap 30.00 30 An Hun [id number] [place] ([id number]) 0  [place]   08/15/2024 08/15/2024 2 Dextroamp-Amphet Er 20 Mg Cap 30.00 30 An Hun [id number] [place] ([id number]) 0  [place]   02/07/2024 02/06/2024 1 Dextroamp-Amphet Er 20 Mg Cap 30.00 30 An Hun [id number] [place] ([id number]) 0  [place]   09/29/2023 08/29/2023 1 Dextroamp-Amphet Er 20 Mg Cap 30.00 30 An Hun [id number] [place] ([id number]) 0  [place]       Vitals:  There were no vitals filed for this visit.    Assessment/Plan   Diagnosis:   Patient Active Problem List   Diagnosis    ADD (attention deficit disorder)    ADHD (attention deficit hyperactivity disorder), inattentive type    Migraine    Sebaceous cyst    Depression       Assessment:  45yo [place] Welsh m, hx of ADHD, Depression, presents for evaluation and treatment. He has had increasing feeling of falling behind, losing things, forgetting things, losing information, and being less organized. He is at acute and chronic low risk of suicide and homicide. He is likely to do well with previous regimen. Pt reporting worse mood, and open to trial of psychotherapy and medication.     Continue Adderall XR 20mg daily.  Continue Wellbutrin 150mg XL Daily.  I reviewed with the patient the possible side effects of medication, including all black box warnings.  Discussed options including SSRI  OARRS report reviewed no issues.    Treatment Goals:  Specify outcomes written in observable, behavioral terms:   Anxiety: reducing negative automatic thoughts and reducing physical symptoms of anxiety  Depression: increasing energy, increasing interest in usual activities, increasing motivation, reducing excessive guilt, reducing fatigue, and reducing negative automatic thoughts    Suicide Risk Assessment:  Low Risk -- Risk factors include: Depression Protective factors include:Denies current suicidal ideation, Denies history of suicide attempts , Future-oriented talk ,  and Willingness to seek help and support     Treatment Plan/Recommendations:   Follow-up plan was discussed with patient.      Referral to:  Outpatient individual therapy.    Review with patient: Treatment plan reviewed with the patient.  Medication risks/benefit reviewed with the patient    Time spent in therapy 20 min  Summary of Psychotherapy component:   Supportive therapy for depression sx  Total time spent 30 min    Mando Díaz MD

## 2025-05-09 ENCOUNTER — APPOINTMENT (OUTPATIENT)
Dept: BEHAVIORAL HEALTH | Facility: CLINIC | Age: 45
End: 2025-05-09
Payer: COMMERCIAL

## 2025-05-09 DIAGNOSIS — F90.0 ADHD (ATTENTION DEFICIT HYPERACTIVITY DISORDER), INATTENTIVE TYPE: ICD-10-CM

## 2025-05-09 DIAGNOSIS — R53.82 CHRONIC FATIGUE: ICD-10-CM

## 2025-05-09 DIAGNOSIS — F32.4 MAJOR DEPRESSIVE DISORDER WITH SINGLE EPISODE, IN PARTIAL REMISSION: ICD-10-CM

## 2025-05-09 PROBLEM — R53.83 FATIGUE: Status: ACTIVE | Noted: 2025-05-09

## 2025-05-09 PROBLEM — E55.9 VITAMIN D DEFICIENCY: Status: ACTIVE | Noted: 2025-05-09

## 2025-05-09 PROBLEM — F32.A DEPRESSIVE DISORDER: Status: ACTIVE | Noted: 2025-05-09

## 2025-05-09 PROCEDURE — 99214 OFFICE O/P EST MOD 30 MIN: CPT | Performed by: PSYCHIATRY & NEUROLOGY

## 2025-05-09 RX ORDER — BUPROPION HYDROCHLORIDE 150 MG/1
150 TABLET ORAL DAILY
Qty: 30 TABLET | Refills: 2 | Status: SHIPPED | OUTPATIENT
Start: 2025-05-09

## 2025-05-09 RX ORDER — DEXTROAMPHETAMINE SACCHARATE, AMPHETAMINE ASPARTATE MONOHYDRATE, DEXTROAMPHETAMINE SULFATE AND AMPHETAMINE SULFATE 5; 5; 5; 5 MG/1; MG/1; MG/1; MG/1
20 CAPSULE, EXTENDED RELEASE ORAL EVERY MORNING
Qty: 30 CAPSULE | Refills: 0 | Status: SHIPPED | OUTPATIENT
Start: 2025-06-06

## 2025-05-09 RX ORDER — DEXTROAMPHETAMINE SACCHARATE, AMPHETAMINE ASPARTATE MONOHYDRATE, DEXTROAMPHETAMINE SULFATE AND AMPHETAMINE SULFATE 5; 5; 5; 5 MG/1; MG/1; MG/1; MG/1
20 CAPSULE, EXTENDED RELEASE ORAL DAILY
Qty: 30 CAPSULE | Refills: 0 | Status: SHIPPED | OUTPATIENT
Start: 2025-05-09

## 2025-05-09 RX ORDER — DEXTROAMPHETAMINE SACCHARATE, AMPHETAMINE ASPARTATE MONOHYDRATE, DEXTROAMPHETAMINE SULFATE AND AMPHETAMINE SULFATE 5; 5; 5; 5 MG/1; MG/1; MG/1; MG/1
20 CAPSULE, EXTENDED RELEASE ORAL EVERY MORNING
Qty: 30 CAPSULE | Refills: 0 | Status: SHIPPED | OUTPATIENT
Start: 2025-07-04

## 2025-05-09 NOTE — PROGRESS NOTES
"Outpatient Psychiatry - Follow-Up Visit with Established Patient       Subjective   Josafat Munoz \"Marty\", a 44 y.o. male, for follow up.  Patient was referred by No primary care provider on file..      Reason for Visit:  He has been experiencing ADHD, Depression.     HPI:   Pt reports he is doing well.  He finds working nights difficult.  He is otherwise very happy doing what he is doing.  He has been working one week on one week off, and he feels the work is tolerable.  He has 80 hrs, then 0 hrs alternating.  Taking the adderall during his shifts.  He feels he has not made any errors and he has felt comfortable even with high intensity situations.  He has even enjoyed doing ED work.  He has felt on point with remembering where everyone is.  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  He has attended better to sleep.  Showing up early for shifts.  He feels [facility].  He discusses feeling respected.  The hours are long, and taking a lot of responsibility for the full spectrum of the case is difficult.  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  He is staying active in research.  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  His parents are coming to visit.  He discusses process of finding his next role.  He feels meds have been tolerable, and his mood has been better.  He has had a reasonable mood, and concentration has been good.  He has been using adderall for work days, feels that is very helpful in getting work done.  Procrastination has been less.  He has not seen Dr. Mcneil recently, but still wants to follow up.  He got an H1 visa. He has not had any crying spells.  Sleep has been okay, staying up too late sometimes.  Appetite has been a little low, but no problems. He is having some enjoyment of things, soccer, exercising, watching soccer. He denies SI, no wishes to die, feels he would never do this.  He has met with his ex, and they have gone out a couple times. " They have been talking daily. His sister and brother both had children this year, planning a visit in the next year. 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 He is spending more time with Vanesa, and thinking there may be a chance to get back together.  He has wanted to get things in order first.  He will be getting a green card when possible.    Current Medications:    Current Outpatient Medications:     amphetamine-dextroamphetamine XR (Adderall XR) 20 mg 24 hr capsule, Take 1 capsule (20 mg) by mouth once daily in the morning. Do not fill before March 21, 2025., Disp: 30 capsule, Rfl: 0    amphetamine-dextroamphetamine XR (Adderall XR) 20 mg 24 hr capsule, Take 1 capsule (20 mg) by mouth once daily. Do not fill before April 18, 2025., Disp: 30 capsule, Rfl: 0    amphetamine-dextroamphetamine XR (Adderall XR) 20 mg 24 hr capsule, Take 1 capsule (20 mg) by mouth once daily in the morning., Disp: 30 capsule, Rfl: 0    buPROPion XL (Wellbutrin XL) 150 mg 24 hr tablet, Take 1 tablet (150 mg) by mouth once daily., Disp: 30 tablet, Rfl: 2    tadalafil 20 mg tablet, AS DIRECTED, Disp: , Rfl:   Medical History and Updates:  Past Medical History:   Diagnosis Date    Allergy status to unspecified drugs, medicaments and biological substances 06/20/2017    History of seasonal allergies    Vitamin D deficiency, unspecified     Vitamin D deficiency     Surgical History and Updates:  No past surgical history on file.  Family History and Updates:  Family History   Problem Relation Name Age of Onset    No Known Problems Mother      No Known Problems Father       Social History and Updates:  Social History     Socioeconomic History    Marital status: Single     Spouse name: Not on file    Number of children: Not on file    Years of education: Not on file    Highest education level: Not on file   Occupational History    Not on file   Tobacco Use    Smoking status: Not on file    Smokeless tobacco: Not on file  "  Substance and Sexual Activity    Alcohol use: Not on file    Drug use: Not on file    Sexual activity: Not on file   Other Topics Concern    Not on file   Social History Narrative    Not on file     Social Drivers of Health     Financial Resource Strain: Not on file   Food Insecurity: Not on file   Transportation Needs: Not on file   Physical Activity: Not on file   Stress: Not on file   Social Connections: Not on file   Intimate Partner Violence: Not on file   Housing Stability: Not on file       Additional historical information includes:   The patient's household members: Living alone, in [place] x 1 year, prior to that in [place].   Relationships: No longer seeing anyone.  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 May find work here, must be underserved.   Legal history: none.    History: none.   Upbringing: Grew up in [place], attending [employer]. Happy childhood, \"cherished.\" Has family relationships of high quality.   Abuse/neglect history:   not physically abused   not sexually abused   no neglect/ abandonment   no domestic violence   not a witness to violence   not verbally or emotionally abused   Record Review: brief     Medical Review Of Systems:  Pertinent items are noted in HPI.    Psychiatric Review Of Systems:  Depressive Symptoms: Depressed/Irritable mood, Diminished interest, Poor concentration, Fatigue, and N/A  Manic Symptoms: negative  Anxiety Symptoms: Excessive worry, Difficulty controlling worry, and Difficulty concentration due to worry  Inattentive Symptoms: Often makes careless mistakes, Often has difficulty paying attention, Is often disorganized, Often losses things, Is often easily distracted, Is often forgetful, Often avoids/dislikes tasks with sustained mental effort, Often seems not to listen when spoken to directly, and Often fails to follow " "instructions or to finish schoolwork   Trauma Symptoms: None  Psychotic Symptoms: None     Objective   Mental Status Exam:  General Appearance: Well groomed, appropriate eye contact  Attitude/Behavior: Cooperative  Motor: No psychomotor agitation or retardation, no tremor or other abnormal movements  Speech: Normal rate, volume, prosody  Gait/Station: WFL - Within functional limits  Mood: \"Good\"  Affect: Euthymic, full-range  Thought Process: Linear, goal directed  Thought Associations: No loosening of associations  Thought Content: Normal  Perception: No perceptual abnormalities noted  Sensorium: Alert and oriented to person, place, time and situation  Insight: Intact  Judgement: Intact  Cognition: Cognitively intact to conversational testing with respect to attention, orientation, fund of knowledge, recent and remote memory, and language    Other Objective Information including Laboratory and Imaging Results:  No visits with results within 30 Day(s) from this visit.   Latest known visit with results is:   Orders Only on 08/16/2023   Component Date Value Ref Range Status    Hemoglobin A1C 08/16/2023 4.6  % Final    Comment:      Diagnosis of Diabetes-Adults   Non-Diabetic: < or = 5.6%   Increased risk for developing diabetes: 5.7-6.4%   Diagnostic of diabetes: > or = 6.5%  .       Monitoring of Diabetes                Age (y)     Therapeutic Goal (%)   Adults:          >18           <7.0   Pediatrics:    13-18           <7.5                   7-12           <8.0                   0- 6            7.5-8.5   American Diabetes Association. Diabetes Care 33(S1), Jan 2010.      Estimated Average Glucose 08/16/2023 85  MG/DL Final    Cholesterol 08/16/2023 165  0 - 199 mg/dL Final    Comment: .      AGE      DESIRABLE   BORDERLINE HIGH   HIGH     0-19 Y     0 - 169       170 - 199     >/= 200    20-24 Y     0 - 189       190 - 224     >/= 225         >24 Y     0 - 199       200 - 239     >/= 240   **All ranges are based " on fasting samples. Specific   therapeutic targets will vary based on patient-specific   cardiac risk.  .   Pediatric guidelines reference:Pediatrics 2011, 128(S5).   Adult guidelines reference: NCEP ATPIII Guidelines,     KIMBERLY 2001, 258:2486-97  .   Venipuncture immediately after or during the    administration of Metamizole may lead to falsely   low results. Testing should be performed immediately   prior to Metamizole dosing.      HDL 08/16/2023 51.0  mg/dL Final    Comment: .      AGE      VERY LOW   LOW     NORMAL    HIGH       0-19 Y       < 35   < 40     40-45     ----    20-24 Y       ----   < 40       >45     ----      >24 Y       ----   < 40     40-60      >60  .      Cholesterol/HDL Ratio 08/16/2023 3.2   Final    Comment: REF VALUES  DESIRABLE  < 3.4  HIGH RISK  > 5.0      LDL 08/16/2023 96  0 - 99 mg/dL Final    Comment: .                           NEAR      BORD      AGE      DESIRABLE  OPTIMAL    HIGH     HIGH     VERY HIGH     0-19 Y     0 - 109     ---    110-129   >/= 130     ----    20-24 Y     0 - 119     ---    120-159   >/= 160     ----      >24 Y     0 -  99   100-129  130-159   160-189     >/=190  .      VLDL 08/16/2023 18  0 - 40 mg/dL Final    Triglycerides 08/16/2023 92  0 - 149 mg/dL Final    Comment: .      AGE      DESIRABLE   BORDERLINE HIGH   HIGH     VERY HIGH   0 D-90 D    19 - 174         ----         ----        ----  91 D- 9 Y     0 -  74        75 -  99     >/= 100      ----    10-19 Y     0 -  89        90 - 129     >/= 130      ----    20-24 Y     0 - 114       115 - 149     >/= 150      ----         >24 Y     0 - 149       150 - 199    200- 499    >/= 500  .   Venipuncture immediately after or during the    administration of Metamizole may lead to falsely   low results. Testing should be performed immediately   prior to Metamizole dosing.         OARRS Reviewed: no concerns  02/21/2025 02/21/2025 1 Dextroamp-Amphet Er 20 Mg Cap 30.00 30 An [facility] [id number] [place] ([id number]) 0  [place]   01/10/2025 01/10/2025 1 Dextroamp-Amphet Er 20 Mg Cap 30.00 30 An [place] [id number] [place] ([id number]) 0  [place]   09/25/2024 09/25/2024 1 Dextroamp-Amphet Er 20 Mg Cap 30.00 30 An [place] [id number] [place] ([id number]) 0  [place]   08/15/2024 08/15/2024 1 Dextroamp-Amphet Er 20 Mg Cap 30.00 30 An [place] [id number] [place] ([id number]) 0  [place]   02/07/2024 02/06/2024 2 Dextroamp-Amphet Er 20 Mg Cap 30.00 30 An [place] [id number] [place] ([id number]) 0  [place]   09/29/2023 08/29/2023 2 Dextroamp-Amphet Er 20 Mg Cap 30.00 30 An [place] [id number] [place] ([id number]) 0  [place]       Vitals:  There were no vitals filed for this visit.    Assessment/Plan   Diagnosis:   Patient Active Problem List   Diagnosis    ADD (attention deficit disorder)    ADHD (attention deficit hyperactivity disorder), inattentive type    Migraine    Sebaceous cyst    Depression    Fatigue    Vitamin D deficiency    Attention deficit hyperactivity disorder (ADHD), predominantly inattentive type    Depressive disorder       Assessment:  43yo [place] Turkish m, hx of ADHD, Depression, presents for evaluation and treatment. He has had increasing feeling of falling behind, losing things, forgetting things, losing information, and being less organized. He is at acute and chronic low risk of suicide and homicide. He is likely to do well with previous regimen. Pt reporting worse mood, and open to trial of psychotherapy and medication.     Continue Adderall XR 20mg daily.  Continue Wellbutrin 150mg XL Daily.  I reviewed with the patient the possible side effects of medication, including all black box warnings.  Discussed options including SSRI  OARRS report reviewed no issues.    Treatment Goals:  Specify outcomes written in observable, behavioral terms:   Anxiety: reducing negative automatic thoughts and reducing physical symptoms of anxiety  Depression: increasing energy, increasing interest in usual activities, increasing motivation, reducing excessive guilt, reducing fatigue, and reducing  negative automatic thoughts    Suicide Risk Assessment:  Low Risk -- Risk factors include: Depression Protective factors include:Denies current suicidal ideation, Denies history of suicide attempts , Future-oriented talk , and Willingness to seek help and support     Treatment Plan/Recommendations:   Follow-up plan was discussed with patient.      Referral to:  Outpatient individual therapy.    Review with patient: Treatment plan reviewed with the patient.  Medication risks/benefit reviewed with the patient    Time spent in therapy 20 min  Summary of Psychotherapy component:   Supportive therapy for depression sx  Total time spent 30 min    Mando Díaz MD

## 2025-05-30 ENCOUNTER — APPOINTMENT (OUTPATIENT)
Dept: BEHAVIORAL HEALTH | Facility: CLINIC | Age: 45
End: 2025-05-30
Payer: COMMERCIAL

## 2025-05-30 ENCOUNTER — TELEMEDICINE (OUTPATIENT)
Dept: BEHAVIORAL HEALTH | Facility: CLINIC | Age: 45
End: 2025-05-30
Payer: COMMERCIAL

## 2025-05-30 DIAGNOSIS — F32.89 OTHER DEPRESSION: ICD-10-CM

## 2025-05-30 DIAGNOSIS — F90.0 ATTENTION DEFICIT HYPERACTIVITY DISORDER (ADHD), PREDOMINANTLY INATTENTIVE TYPE: ICD-10-CM

## 2025-05-30 PROCEDURE — 90791 PSYCH DIAGNOSTIC EVALUATION: CPT | Performed by: PSYCHOLOGIST

## 2025-05-31 NOTE — PROGRESS NOTES
"Dictated on Dragon Medical One software:  24531 Psychotherapy Evaluation Update (Depression and ADHD) (60 min, [phone])  Virtual visit. Supportive Therapy. Patient last seen in January, 2024.  Patient continues to see,  psychiatrist, Mando has not for depression and ADHD.  Currently managed on Wellbutrin XL (150 mg/day) and Adderall XR (20 mg/day).  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  Works long hours.  Patient likes his job.  When I last saw the patient he was going back to [place]  until his green card paperwork came through for his position at the [employer].  Spent time with family in [place].  When he returned to [place] he began dating his  ex-girlfriend, Vanesa, who was on faculty at [employer].  We spent most of the time discussing this today.  Patient reported that they broke up a few days ago.  Patient is saddened by the break-up.  Patient noted that he can feel her withdraw from the relationship.  She was telling him that she wanted more intimacy which she could not provide.  Patient reported that at the end of the time when they were together.  She had been hanging out with another man.  He suspected that they were romantically involved and she denied it.  However, a few days ago when pressed she admitted that that had actually been the case.  Patient noted that this was 1 reason he was having a hard time being closer with her.  \"I showed her 50% of the blank.  I thought I can get past it.\"  Patient is preparing to move on from the relationship.  They have had a few interactions but very brief.  They have been together on and off for 12 years.  Patient has limited social supports in the area.  We discussed   the importance of extending his support network.  He was also doing some dating when he and his girlfriend got back together.  Encouraged him to get back to running which was something that was a helpful coping strategy in the past. Next: 2-3 weeks.  "

## 2025-06-12 ENCOUNTER — APPOINTMENT (OUTPATIENT)
Dept: BEHAVIORAL HEALTH | Facility: CLINIC | Age: 45
End: 2025-06-12
Payer: COMMERCIAL

## 2025-06-12 DIAGNOSIS — F90.0 ATTENTION DEFICIT HYPERACTIVITY DISORDER (ADHD), PREDOMINANTLY INATTENTIVE TYPE: ICD-10-CM

## 2025-06-12 DIAGNOSIS — F32.89 OTHER DEPRESSION: ICD-10-CM

## 2025-06-12 PROCEDURE — 90837 PSYTX W PT 60 MINUTES: CPT | Performed by: PSYCHOLOGIST

## 2025-06-12 NOTE — PROGRESS NOTES
Dictated on Dragon Medical One software:  2 PM 54210 Indiv Psych for Depression and ADHD (60 min, 205-303)  Virtual visit. Supportive Therapy.  Patient continues to grieve the loss of his relationship with Vanesa.  Is feeling better since we last met a few weeks ago.  He has stress-free work 14 days in a row and is currently on call.  Work was demanding but a good distraction.  Has had a few limited contacts with ask that we discussed.  Plans on visiting brother and nephews in [place], leaving tomorrow.  We discussed the importance of exercise and reestablishing social supports as well as building new relationships.  Patient reported that he has been challenged to keep exercise going tickly when he is on night shift. Next: 3 weeks.

## 2025-07-03 ENCOUNTER — APPOINTMENT (OUTPATIENT)
Dept: BEHAVIORAL HEALTH | Facility: CLINIC | Age: 45
End: 2025-07-03
Payer: COMMERCIAL

## 2025-07-03 DIAGNOSIS — F90.0 ATTENTION DEFICIT HYPERACTIVITY DISORDER (ADHD), PREDOMINANTLY INATTENTIVE TYPE: ICD-10-CM

## 2025-07-03 DIAGNOSIS — F32.89 OTHER DEPRESSION: ICD-10-CM

## 2025-07-03 PROCEDURE — 90837 PSYTX W PT 60 MINUTES: CPT | Performed by: PSYCHOLOGIST

## 2025-07-03 NOTE — PROGRESS NOTES
Dictated on Dragon Medical One software:  12 PM 37837 Indiv Psych for Depression and ADHD (60 min, 1205-100)  Virtual visit. Supportive Therapy.  Patient doing well overall.  He was able to get off work and went to his brothers in [place] for 10's.  He had a great time visiting and catching up with family.  It also has helped his sense of feeling lonely.  He is returned back to [place] with the busy work schedule.  Will get back to working 7 days on and 7 days off.  Has continue to focus on relationship with his ex partner, Vanesa, at times in the resentment creaks backend.  They have had limited contact with birthday wishes via text.  Patient has been doing some online dating and has been out with 1 woman 3 times and plans to go out again.  We spent some time devoted to some frustrations and difficulties with online dating.  Patient will be moving September 1 to a new apartment complex.  He is continued to have some sleep problems.  Does not want to rely solely on dating relationships to get his social needs met and plans on continuing to invest in friendships.  He and his brother are in an exercise challenge together and he plans to get more consistent with exercise.  He reported that he will be running later today. Next: 2 weeks.

## 2025-07-16 ENCOUNTER — TELEPHONE (OUTPATIENT)
Dept: BEHAVIORAL HEALTH | Facility: CLINIC | Age: 45
End: 2025-07-16
Payer: COMMERCIAL

## 2025-07-16 NOTE — PROGRESS NOTES
Amphetamine-Dextroamphet ER 20MG er capsules  Prior authorization has been APPROVED from 6/16/2025 to  Authorization Expiration Date: 7/16/2026

## 2025-07-17 ENCOUNTER — APPOINTMENT (OUTPATIENT)
Dept: BEHAVIORAL HEALTH | Facility: CLINIC | Age: 45
End: 2025-07-17
Payer: COMMERCIAL

## 2025-07-17 DIAGNOSIS — F90.0 ATTENTION DEFICIT HYPERACTIVITY DISORDER (ADHD), PREDOMINANTLY INATTENTIVE TYPE: ICD-10-CM

## 2025-07-17 DIAGNOSIS — F32.89 OTHER DEPRESSION: ICD-10-CM

## 2025-07-17 PROCEDURE — 90837 PSYTX W PT 60 MINUTES: CPT | Performed by: PSYCHOLOGIST

## 2025-07-17 NOTE — PROGRESS NOTES
Dictated on Dragon Medical One software:  12 PM 75263 Indiv Psych for Depression and ADHD (60 min, 1204-100)  Virtual visit. Supportive Therapy.  Patient doing well overall.  We discussed a number of tasks that he would like to complete including getting his 's license, contacting movers for his move on September 1.  Spent much of the time discussing person he had been dating that is now ended.  Discussed some red flag behaviors that made person undesirable.  He is excepted this ending and has his profile back online dating sites.  Has had limited contact with Vanesa malave.  He has got some positive feedback from various professionals about his work behavior and attitude.  Continues to have problems with the swing shift and getting to sleep after long cycles of working 10 days in a row.  His exercise has been going well and he is lost 8 to 9 pounds.  Feeling good about this.  Making some efforts to get out with others socially.  Next: 1 month.

## 2025-08-05 ENCOUNTER — APPOINTMENT (OUTPATIENT)
Dept: BEHAVIORAL HEALTH | Facility: CLINIC | Age: 45
End: 2025-08-05
Payer: COMMERCIAL

## 2025-08-05 DIAGNOSIS — F32.4 MAJOR DEPRESSIVE DISORDER WITH SINGLE EPISODE, IN PARTIAL REMISSION: ICD-10-CM

## 2025-08-05 DIAGNOSIS — F90.0 ADHD (ATTENTION DEFICIT HYPERACTIVITY DISORDER), INATTENTIVE TYPE: ICD-10-CM

## 2025-08-05 PROCEDURE — 99214 OFFICE O/P EST MOD 30 MIN: CPT | Performed by: PSYCHIATRY & NEUROLOGY

## 2025-08-05 RX ORDER — DEXTROAMPHETAMINE SACCHARATE, AMPHETAMINE ASPARTATE MONOHYDRATE, DEXTROAMPHETAMINE SULFATE AND AMPHETAMINE SULFATE 5; 5; 5; 5 MG/1; MG/1; MG/1; MG/1
20 CAPSULE, EXTENDED RELEASE ORAL DAILY
Qty: 30 CAPSULE | Refills: 0 | Status: SHIPPED | OUTPATIENT
Start: 2025-09-30

## 2025-08-05 RX ORDER — BUPROPION HYDROCHLORIDE 300 MG/1
300 TABLET ORAL DAILY
Qty: 30 TABLET | Refills: 3 | Status: SHIPPED | OUTPATIENT
Start: 2025-08-05

## 2025-08-05 RX ORDER — DEXTROAMPHETAMINE SACCHARATE, AMPHETAMINE ASPARTATE MONOHYDRATE, DEXTROAMPHETAMINE SULFATE AND AMPHETAMINE SULFATE 5; 5; 5; 5 MG/1; MG/1; MG/1; MG/1
20 CAPSULE, EXTENDED RELEASE ORAL EVERY MORNING
Qty: 30 CAPSULE | Refills: 0 | Status: SHIPPED | OUTPATIENT
Start: 2025-08-05

## 2025-08-05 RX ORDER — DEXTROAMPHETAMINE SACCHARATE, AMPHETAMINE ASPARTATE MONOHYDRATE, DEXTROAMPHETAMINE SULFATE AND AMPHETAMINE SULFATE 5; 5; 5; 5 MG/1; MG/1; MG/1; MG/1
20 CAPSULE, EXTENDED RELEASE ORAL EVERY MORNING
Qty: 30 CAPSULE | Refills: 0 | Status: SHIPPED | OUTPATIENT
Start: 2025-09-02

## 2025-08-05 NOTE — PROGRESS NOTES
"Outpatient Psychiatry - Follow-Up Visit with Established Patient       Subjective   Josafat Munoz \"Marty\", a 45 y.o. male, for follow up.  Patient was referred by No primary care provider on file..      Reason for Visit:  He has been experiencing ADHD, Depression.     HPI:   Pt reports he has had some lower energy levels, similar to a year ago.  He reports that work is going well.  He has had to use a benadryl for sleep, and he is having worrisome thoughts when he is waking up.  He finds it is worse at home and when he has time off.  He notices a lack of social life, and he and Vanesa have fallen apart again.  He is not going to seek contact with her again.  He has been working one week on one week off, and he feels the work is tolerable.  He has 80 hrs, then 0 hrs alternating.  Taking the adderall during his shifts.  He feels he has not made any errors and he has felt comfortable even with high intensity situations.  He has even enjoyed doing ED work.  He has felt on point with remembering where everyone is.  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  He has attended better to sleep.  Showing up early for shifts.  [facility] feels [facility].  He discusses feeling respected.  The hours are long, and taking a lot of responsibility for the full spectrum of the case is difficult.  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  He is staying active in research.  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  His parents are coming to visit.  He discusses process of finding his next role.  He feels meds have been tolerable, and his mood has been better.  He has had a reasonable mood, and concentration has been good.  He has been using adderall for work days, feels that is very helpful in getting work done.  Procrastination has been less.  He has not seen Dr. Mcneil recently, but still wants to follow up.  He got an H1 visa. He has not had any crying spells.  Sleep has been okay, staying up " too late sometimes.  Appetite has been a little low, but no problems. He is having some enjoyment of things, soccer, exercising, watching soccer. He denies SI, no wishes to die, feels he would never do this.  He has met with his ex, and they have gone out a couple times. They have been talking daily. His sister and brother both had children this year, planning a visit in the next year. 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 He is spending more time with Vanesa, and thinking there may be a chance to get back together.  He has wanted to get things in order first.  He will be getting a green card when possible.    Current Medications:    Current Outpatient Medications:     amphetamine-dextroamphetamine XR (Adderall XR) 20 mg 24 hr capsule, Take 1 capsule (20 mg) by mouth once daily., Disp: 30 capsule, Rfl: 0    amphetamine-dextroamphetamine XR (Adderall XR) 20 mg 24 hr capsule, Take 1 capsule (20 mg) by mouth once daily in the morning. Do not fill before June 6, 2025., Disp: 30 capsule, Rfl: 0    amphetamine-dextroamphetamine XR (Adderall XR) 20 mg 24 hr capsule, Take 1 capsule (20 mg) by mouth once daily in the morning. Do not fill before July 4, 2025., Disp: 30 capsule, Rfl: 0    buPROPion XL (Wellbutrin XL) 150 mg 24 hr tablet, Take 1 tablet (150 mg) by mouth once daily., Disp: 30 tablet, Rfl: 2    tadalafil 20 mg tablet, AS DIRECTED, Disp: , Rfl:   Medical History and Updates:  Past Medical History:   Diagnosis Date    Allergy status to unspecified drugs, medicaments and biological substances 06/20/2017    History of seasonal allergies    Vitamin D deficiency, unspecified     Vitamin D deficiency     Surgical History and Updates:  No past surgical history on file.  Family History and Updates:  Family History   Problem Relation Name Age of Onset    No Known Problems Mother      No Known Problems Father       Social History and Updates:  Social History     Socioeconomic History    Marital status:  "Single     Spouse name: Not on file    Number of children: Not on file    Years of education: Not on file    Highest education level: Not on file   Occupational History    Not on file   Tobacco Use    Smoking status: Never    Smokeless tobacco: Never   Substance and Sexual Activity    Alcohol use: Not Currently    Drug use: Not on file    Sexual activity: Not Currently     Partners: Female   Other Topics Concern    Not on file   Social History Narrative    Not on file     Social Drivers of Health     Financial Resource Strain: Not on file   Food Insecurity: Not on file   Transportation Needs: Not on file   Physical Activity: Not on file   Stress: Not on file   Social Connections: Not on file   Intimate Partner Violence: Not on file   Housing Stability: Not on file       Additional historical information includes:   The patient's household members: Living alone, in [place] x 1 year, prior to that in [place].   Relationships: No longer seeing anyone.  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 May find work here, must be underserved.   Legal history: none.    History: none.   Upbringing: Grew up in [place], attending [employer]. Happy childhood, \"cherished.\" Has family relationships of high quality.   Abuse/neglect history:   not physically abused   not sexually abused   no neglect/ abandonment   no domestic violence   not a witness to violence   not verbally or emotionally abused   Record Review: brief     Medical Review Of Systems:  Pertinent items are noted in HPI.    Psychiatric Review Of Systems:  Depressive Symptoms: Depressed/Irritable mood, Diminished interest, Poor concentration, Fatigue, and N/A  Manic Symptoms: negative  Anxiety Symptoms: Excessive worry, Difficulty controlling worry, and Difficulty concentration due to worry  Inattentive Symptoms: Often makes careless " mistakes, Often has difficulty paying attention, Is often disorganized, Often losses things, Is often easily distracted, Is often forgetful, Often avoids/dislikes tasks with sustained mental effort, Often seems not to listen when spoken to directly, and Often fails to follow instructions or to finish schoolwork   Trauma Symptoms: None  Psychotic Symptoms: None     Objective   Mental Status Exam:       Other Objective Information including Laboratory and Imaging Results:  No visits with results within 30 Day(s) from this visit.   Latest known visit with results is:   Orders Only on 08/16/2023   Component Date Value Ref Range Status    Hemoglobin A1C 08/16/2023 4.6  % Final    Comment:      Diagnosis of Diabetes-Adults   Non-Diabetic: < or = 5.6%   Increased risk for developing diabetes: 5.7-6.4%   Diagnostic of diabetes: > or = 6.5%  .       Monitoring of Diabetes                Age (y)     Therapeutic Goal (%)   Adults:          >18           <7.0   Pediatrics:    13-18           <7.5                   7-12           <8.0                   0- 6            7.5-8.5   American Diabetes Association. Diabetes Care 33(S1), Jan 2010.      Estimated Average Glucose 08/16/2023 85  MG/DL Final    Cholesterol 08/16/2023 165  0 - 199 mg/dL Final    Comment: .      AGE      DESIRABLE   BORDERLINE HIGH   HIGH     0-19 Y     0 - 169       170 - 199     >/= 200    20-24 Y     0 - 189       190 - 224     >/= 225         >24 Y     0 - 199       200 - 239     >/= 240   **All ranges are based on fasting samples. Specific   therapeutic targets will vary based on patient-specific   cardiac risk.  .   Pediatric guidelines reference:Pediatrics 2011, 128(S5).   Adult guidelines reference: NCEP ATPIII Guidelines,     KIMBERLY 2001, 258:2486-97  .   Venipuncture immediately after or during the    administration of Metamizole may lead to falsely   low results. Testing should be performed immediately   prior to Metamizole dosing.      HDL  08/16/2023 51.0  mg/dL Final    Comment: .      AGE      VERY LOW   LOW     NORMAL    HIGH       0-19 Y       < 35   < 40     40-45     ----    20-24 Y       ----   < 40       >45     ----      >24 Y       ----   < 40     40-60      >60  .      Cholesterol/HDL Ratio 08/16/2023 3.2   Final    Comment: REF VALUES  DESIRABLE  < 3.4  HIGH RISK  > 5.0      LDL 08/16/2023 96  0 - 99 mg/dL Final    Comment: .                           NEAR      BORD      AGE      DESIRABLE  OPTIMAL    HIGH     HIGH     VERY HIGH     0-19 Y     0 - 109     ---    110-129   >/= 130     ----    20-24 Y     0 - 119     ---    120-159   >/= 160     ----      >24 Y     0 -  99   100-129  130-159   160-189     >/=190  .      VLDL 08/16/2023 18  0 - 40 mg/dL Final    Triglycerides 08/16/2023 92  0 - 149 mg/dL Final    Comment: .      AGE      DESIRABLE   BORDERLINE HIGH   HIGH     VERY HIGH   0 D-90 D    19 - 174         ----         ----        ----  91 D- 9 Y     0 -  74        75 -  99     >/= 100      ----    10-19 Y     0 -  89        90 - 129     >/= 130      ----    20-24 Y     0 - 114       115 - 149     >/= 150      ----         >24 Y     0 - 149       150 - 199    200- 499    >/= 500  .   Venipuncture immediately after or during the    administration of Metamizole may lead to falsely   low results. Testing should be performed immediately   prior to Metamizole dosing.         OARRS Reviewed: no concerns  02/21/2025 02/21/2025 1 Dextroamp-Amphet Er 20 Mg Cap 30.00 30 An [place] [id number] [place] ([id number]) 0  [place]   01/10/2025 01/10/2025 1 Dextroamp-Amphet Er 20 Mg Cap 30.00 30 An [place] [id number] [place] ([id number]) 0  [place]   09/25/2024 09/25/2024 1 Dextroamp-Amphet Er 20 Mg Cap 30.00 30 An [place] [id number] [place] ([id number]) 0  [place]   08/15/2024 08/15/2024 1 Dextroamp-Amphet Er 20 Mg Cap 30.00 30 An [facility] [id number] [place] ([id number]) 0  [place]   02/07/2024 02/06/2024 2 Dextroamp-Amphet Er 20 Mg Cap 30.00 30 An [facility] [id number] [place] ([id number]) 0  [place]   09/29/2023  08/29/2023 2 Dextroamp-Amphet Er 20 Mg Cap 30.00 30 An [facility] [id number] [place] ([id number]) 0  [place]       Vitals:  There were no vitals filed for this visit.    Assessment/Plan   Diagnosis:   Patient Active Problem List   Diagnosis    ADD (attention deficit disorder)    ADHD (attention deficit hyperactivity disorder), inattentive type    Migraine    Sebaceous cyst    Depression    Fatigue    Vitamin D deficiency    Attention deficit hyperactivity disorder (ADHD), predominantly inattentive type    Depressive disorder       Assessment:  45yo [place] Greek m, hx of ADHD, Depression, presents for evaluation and treatment. He has had increasing feeling of falling behind, losing things, forgetting things, losing information, and being less organized. He is at acute and chronic low risk of suicide and homicide. He is likely to do well with previous regimen. Pt reporting worse mood, and open to trial of psychotherapy and medication.     Continue Adderall XR 20mg daily.  Continue Wellbutrin 150mg XL Daily.  I reviewed with the patient the possible side effects of medication, including all black box warnings.  Discussed options including SSRI  OARRS report reviewed no issues.    Treatment Goals:  Specify outcomes written in observable, behavioral terms:   Anxiety: reducing negative automatic thoughts and reducing physical symptoms of anxiety  Depression: increasing energy, increasing interest in usual activities, increasing motivation, reducing excessive guilt, reducing fatigue, and reducing negative automatic thoughts    Suicide Risk Assessment:  Low Risk -- Risk factors include: Depression Protective factors include:Denies current suicidal ideation, Denies history of suicide attempts , Future-oriented talk , and Willingness to seek help and support     Treatment Plan/Recommendations:   Follow-up plan was discussed with patient.      Referral to:  Outpatient individual therapy.    Review with patient: Treatment plan reviewed with  the patient.  Medication risks/benefit reviewed with the patient    Time spent in therapy 20 min  Summary of Psychotherapy component:   Supportive therapy for depression sx  Total time spent 30 min    Mando Díaz MD

## 2025-08-12 ENCOUNTER — APPOINTMENT (OUTPATIENT)
Dept: BEHAVIORAL HEALTH | Facility: CLINIC | Age: 45
End: 2025-08-12
Payer: COMMERCIAL

## 2025-08-21 ENCOUNTER — APPOINTMENT (OUTPATIENT)
Dept: BEHAVIORAL HEALTH | Facility: CLINIC | Age: 45
End: 2025-08-21
Payer: COMMERCIAL

## 2025-08-21 DIAGNOSIS — F90.0 ATTENTION DEFICIT HYPERACTIVITY DISORDER (ADHD), PREDOMINANTLY INATTENTIVE TYPE: ICD-10-CM

## 2025-08-21 DIAGNOSIS — F32.89 OTHER DEPRESSION: ICD-10-CM

## 2025-08-21 PROCEDURE — 90837 PSYTX W PT 60 MINUTES: CPT | Performed by: PSYCHOLOGIST

## 2025-09-02 ENCOUNTER — APPOINTMENT (OUTPATIENT)
Dept: BEHAVIORAL HEALTH | Facility: CLINIC | Age: 45
End: 2025-09-02
Payer: COMMERCIAL

## 2025-09-19 ENCOUNTER — APPOINTMENT (OUTPATIENT)
Dept: BEHAVIORAL HEALTH | Facility: CLINIC | Age: 45
End: 2025-09-19
Payer: COMMERCIAL

## 2025-11-11 ENCOUNTER — APPOINTMENT (OUTPATIENT)
Dept: BEHAVIORAL HEALTH | Facility: CLINIC | Age: 45
End: 2025-11-11
Payer: COMMERCIAL